# Patient Record
Sex: MALE | Race: WHITE | URBAN - METROPOLITAN AREA
[De-identification: names, ages, dates, MRNs, and addresses within clinical notes are randomized per-mention and may not be internally consistent; named-entity substitution may affect disease eponyms.]

---

## 2017-04-19 ENCOUNTER — OFFICE VISIT (OUTPATIENT)
Dept: FAMILY MEDICINE CLINIC | Facility: CLINIC | Age: 27
End: 2017-04-19

## 2017-04-19 VITALS
WEIGHT: 155 LBS | DIASTOLIC BLOOD PRESSURE: 78 MMHG | SYSTOLIC BLOOD PRESSURE: 116 MMHG | HEART RATE: 77 BPM | TEMPERATURE: 98 F | BODY MASS INDEX: 22.96 KG/M2 | HEIGHT: 69 IN | RESPIRATION RATE: 14 BRPM

## 2017-04-19 DIAGNOSIS — Z00.00 ANNUAL PHYSICAL EXAM: Primary | ICD-10-CM

## 2017-04-19 PROCEDURE — 99385 PREV VISIT NEW AGE 18-39: CPT | Performed by: FAMILY MEDICINE

## 2017-04-19 RX ORDER — CALCIUM CARBONATE 500(1250)
TABLET ORAL
COMMUNITY

## 2017-04-19 NOTE — PROGRESS NOTES
HPI:    Ирина Melo is a 32year old male presents to clinic for an annual physical exam.   Concerns/Complaints regarding health: none, no recent illnesses  Chronic Medical Issues: none  Sexually Active?  Yes with 1 partner, no concerns  Sleep: 6-8 hour Normocephalic and atraumatic.    Right Ear: Tympanic membrane, external ear and ear canal normal.   Left Ear: Tympanic membrane, external ear and ear canal normal.   Nose: Nose normal.   Mouth/Throat: Uvula is midline, oropharynx is clear and moist and muco

## 2017-04-28 ENCOUNTER — TELEPHONE (OUTPATIENT)
Dept: FAMILY MEDICINE CLINIC | Facility: CLINIC | Age: 27
End: 2017-04-28

## 2017-11-08 ENCOUNTER — NURSE TRIAGE (OUTPATIENT)
Dept: OTHER | Age: 27
End: 2017-11-08

## 2017-11-08 ENCOUNTER — OFFICE VISIT (OUTPATIENT)
Dept: FAMILY MEDICINE CLINIC | Facility: CLINIC | Age: 27
End: 2017-11-08

## 2017-11-08 VITALS
RESPIRATION RATE: 14 BRPM | HEIGHT: 69 IN | BODY MASS INDEX: 24.26 KG/M2 | HEART RATE: 62 BPM | DIASTOLIC BLOOD PRESSURE: 83 MMHG | SYSTOLIC BLOOD PRESSURE: 130 MMHG | WEIGHT: 163.81 LBS | TEMPERATURE: 97 F

## 2017-11-08 DIAGNOSIS — R09.81 SINUS CONGESTION: Primary | ICD-10-CM

## 2017-11-08 PROCEDURE — 99212 OFFICE O/P EST SF 10 MIN: CPT | Performed by: FAMILY MEDICINE

## 2017-11-08 PROCEDURE — 99213 OFFICE O/P EST LOW 20 MIN: CPT | Performed by: FAMILY MEDICINE

## 2017-11-08 RX ORDER — FLUTICASONE PROPIONATE 50 MCG
2 SPRAY, SUSPENSION (ML) NASAL DAILY
Qty: 1 BOTTLE | Refills: 0 | Status: SHIPPED | OUTPATIENT
Start: 2017-11-08 | End: 2018-11-03

## 2017-11-08 NOTE — TELEPHONE ENCOUNTER
Action Requested: Summary for Provider     []  Critical Lab, Recommendations Needed  [] Need Additional Advice  [x]   FYI    []   Need Orders  [] Need Medications Sent to Pharmacy  []  Other     SUMMARY: appt scheduled today for headache and dizziness that

## 2017-11-08 NOTE — PROGRESS NOTES
HPI:    Shannon Barber is a 32year old male presents to clinic with a 4 day history of headaches and dizziness. States that headaches occur on the right side of his forehead to the back of his head.  Also says he has been getting bouts of dizziness while external ear and ear canal normal.   Left Ear: Tympanic membrane, external ear and ear canal normal.   Nose: Mucosal edema and rhinorrhea present. Right sinus exhibits no maxillary sinus tenderness and no frontal sinus tenderness.  Left sinus exhibits no ma

## 2018-03-12 ENCOUNTER — OFFICE VISIT (OUTPATIENT)
Dept: FAMILY MEDICINE CLINIC | Facility: CLINIC | Age: 28
End: 2018-03-12

## 2018-03-12 VITALS
TEMPERATURE: 99 F | RESPIRATION RATE: 14 BRPM | BODY MASS INDEX: 25 KG/M2 | WEIGHT: 166 LBS | DIASTOLIC BLOOD PRESSURE: 85 MMHG | SYSTOLIC BLOOD PRESSURE: 130 MMHG | HEART RATE: 99 BPM

## 2018-03-12 DIAGNOSIS — R21 RASH AND OTHER NONSPECIFIC SKIN ERUPTION: Primary | ICD-10-CM

## 2018-03-12 PROCEDURE — 99212 OFFICE O/P EST SF 10 MIN: CPT | Performed by: FAMILY MEDICINE

## 2018-03-12 PROCEDURE — 99213 OFFICE O/P EST LOW 20 MIN: CPT | Performed by: FAMILY MEDICINE

## 2018-03-12 RX ORDER — CLINDAMYCIN HYDROCHLORIDE 300 MG/1
300 CAPSULE ORAL 3 TIMES DAILY
Qty: 30 CAPSULE | Refills: 0 | Status: SHIPPED | OUTPATIENT
Start: 2018-03-12 | End: 2018-03-22

## 2018-03-13 ENCOUNTER — NURSE TRIAGE (OUTPATIENT)
Dept: OTHER | Age: 28
End: 2018-03-13

## 2018-03-13 NOTE — TELEPHONE ENCOUNTER
Action Requested: Summary for Provider     []  Critical Lab, Recommendations Needed  [] Need Additional Advice  []   FYI    []   Need Orders  [] Need Medications Sent to Pharmacy  []  Other     SUMMARY: Advised to go to [facility] per protocol.     Patient calling

## 2018-03-14 NOTE — TELEPHONE ENCOUNTER
LMTCB. Transfer patient to triage.     Pt seen in [facility] yesterday

## 2018-06-20 ENCOUNTER — TELEPHONE (OUTPATIENT)
Dept: FAMILY MEDICINE CLINIC | Facility: CLINIC | Age: 28
End: 2018-06-20

## 2018-06-20 NOTE — TELEPHONE ENCOUNTER
Per pt, his insurance will end up at the end of June 2018 and would like to know if Dr Azeb Dang can accommodate him to have Px before his insurance will ends,  Offered other dr in [place] but pt stts that he don't have transportation to use. Pls advise.
